# Patient Record
Sex: FEMALE | Race: BLACK OR AFRICAN AMERICAN | Employment: UNEMPLOYED | URBAN - METROPOLITAN AREA
[De-identification: names, ages, dates, MRNs, and addresses within clinical notes are randomized per-mention and may not be internally consistent; named-entity substitution may affect disease eponyms.]

---
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
## 2023-01-10 ENCOUNTER — SCHEDULED TELEPHONE ENCOUNTER (OUTPATIENT)
Dept: OBGYN | Age: 18
End: 2023-01-10

## 2023-01-10 ENCOUNTER — FOLLOWUP TELEPHONE ENCOUNTER (OUTPATIENT)
Dept: OBGYN | Age: 18
End: 2023-01-10

## 2023-01-10 DIAGNOSIS — Z86.59 HISTORY OF DEPRESSION: ICD-10-CM

## 2023-01-10 DIAGNOSIS — A74.9 CHLAMYDIA: ICD-10-CM

## 2023-01-10 DIAGNOSIS — Z83.3 FAMILY HISTORY OF DIABETES MELLITUS IN FIRST DEGREE RELATIVE: ICD-10-CM

## 2023-01-10 DIAGNOSIS — Z13.31 NEGATIVE DEPRESSION SCREENING: ICD-10-CM

## 2023-01-10 DIAGNOSIS — O09.899 HIGH RISK TEEN PREGNANCY, ANTEPARTUM: ICD-10-CM

## 2023-01-10 DIAGNOSIS — Z86.39 HISTORY OF HYPERPROLACTINEMIA: ICD-10-CM

## 2023-01-10 DIAGNOSIS — Z86.39 HISTORY OF VITAMIN D DEFICIENCY: ICD-10-CM

## 2023-01-10 DIAGNOSIS — Z92.29 COVID-19 VACCINE SERIES COMPLETED: ICD-10-CM

## 2023-01-10 DIAGNOSIS — Z59.82 TRANSPORTATION INSECURITY: ICD-10-CM

## 2023-01-10 PROBLEM — Z81.8 FAMILY HISTORY OF AUTISM IN SIBLING: Status: ACTIVE | Noted: 2023-01-10

## 2023-01-10 PROBLEM — Z86.19 HISTORY OF CHLAMYDIA: Status: ACTIVE | Noted: 2023-01-10

## 2023-01-10 PROBLEM — R32 URINARY INCONTINENCE: Status: ACTIVE | Noted: 2023-01-10

## 2023-01-10 RX ORDER — PNV NO.95/FERROUS FUM/FOLIC AC 28MG-0.8MG
1 TABLET ORAL DAILY
Qty: 30 TABLET | Refills: 12 | Status: SHIPPED | OUTPATIENT
Start: 2023-01-10

## 2023-01-10 RX ORDER — ASPIRIN 81 MG/1
81 TABLET ORAL DAILY
Qty: 30 TABLET | Refills: 5 | Status: SHIPPED | OUTPATIENT
Start: 2023-01-10

## 2023-01-10 SDOH — ECONOMIC STABILITY - TRANSPORTATION SECURITY: TRANSPORTATION INSECURITY: Z59.82

## 2023-01-10 ASSESSMENT — PATIENT HEALTH QUESTIONNAIRE - PHQ9
1. LITTLE INTEREST OR PLEASURE IN DOING THINGS: 0
SUM OF ALL RESPONSES TO PHQ9 QUESTIONS 1 & 2: 0
2. FEELING DOWN, DEPRESSED OR HOPELESS: 0
SUM OF ALL RESPONSES TO PHQ QUESTIONS 1-9: 0

## 2023-01-10 NOTE — TELEPHONE ENCOUNTER
SW spoke with Pt for depression screen and [facility] initial assessment. Pt reported having a good support system, needing some help with transportation, baby items. Reports cessation of tobacco, alcohol and thc. SW completed lead screen with Pt. No risks detected  Pt scored 0 on PHQ-2. SW educated Pt on safe sleep, infant mortality, smoking cessation. No issues or concerns with MH symptoms, no depression or anxiety. No issues to discuss with SW at this time outside of transportation. SW will meet with Pt at next appt to assist with applying for Medicaid cab. Pt is linked with [facility] and will contact [address]. Pt agreed to [facility]. Pt informed she will speak with Mary WING to complete the intake. SW will follow up at 28 weeks and as needed. Lead screening for pregnant and breast-feeding women. Do you live in or regularly visit a home built before 1978 that has had renovations, repair work, or remodeling in the past 12 months? No  Have you resided in or emigrated from areas were  contamination is high ([facility])? No  Do you live near a manufacturing plant where lead is used e.g. battery manufacturing or recycling, ship building, or plastic manufacturing? No  Do you work with lead or live with someone who does? No  Do you cook with, store or serve food in [facility]? No  Do you eat none food substances that may be contaminated with lead such as soil or lead glazed ceramic pottery? No  Do you use alternative therapies, herbs or home remedies imported from [facility], [facility], [place], [place] or  countries? No  Do you use imported traditional cosmetics such as yaya or surma that may be contaminated with lead? No  Do you or a family member engage in hobbies where lead is used e.g. stained glass or making pottery with lead glaze? No  Has your home been identified as having lead pipes or water source lines with lead?  No  Have you ever been told that you have high levels of lead in your body? No  Do you live with someone identified as having elevated lead levels, child, close friend or relative?  No      Patients answering yes to any one of the above questions, offer blood lead level testing (LAB98), associate with diagnosis of Screening for Lead Poisoning, Z13.88.
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
## 2023-01-17 ENCOUNTER — HOSPITAL ENCOUNTER (OUTPATIENT)
Age: 18
Setting detail: SPECIMEN
Discharge: HOME | End: 2023-01-17

## 2023-01-17 ENCOUNTER — INITIAL PRENATAL (OUTPATIENT)
Dept: OBGYN | Age: 18
End: 2023-01-17
Payer: MEDICAID

## 2023-01-17 VITALS
BODY MASS INDEX: 29.82 KG/M2 | HEART RATE: 84 BPM | WEIGHT: 190 LBS | HEIGHT: 67 IN | DIASTOLIC BLOOD PRESSURE: 78 MMHG | SYSTOLIC BLOOD PRESSURE: 113 MMHG

## 2023-01-17 DIAGNOSIS — Z83.3 FAMILY HISTORY OF DIABETES MELLITUS IN FIRST DEGREE RELATIVE: ICD-10-CM

## 2023-01-17 DIAGNOSIS — A74.9 CHLAMYDIA: ICD-10-CM

## 2023-01-17 DIAGNOSIS — Z86.59 HISTORY OF DEPRESSION: ICD-10-CM

## 2023-01-17 DIAGNOSIS — O09.899 HIGH RISK TEEN PREGNANCY, ANTEPARTUM: ICD-10-CM

## 2023-01-17 DIAGNOSIS — Z81.8 FAMILY HISTORY OF AUTISM IN SIBLING: ICD-10-CM

## 2023-01-17 DIAGNOSIS — Z3A.12 12 WEEKS GESTATION OF PREGNANCY: ICD-10-CM

## 2023-01-17 DIAGNOSIS — Z3A.12 12 WEEKS GESTATION OF PREGNANCY: Primary | ICD-10-CM

## 2023-01-17 LAB
ABO/RH: NORMAL
ABSOLUTE EOS #: 0.07 K/UL (ref 0–0.44)
ABSOLUTE IMMATURE GRANULOCYTE: 0.09 K/UL (ref 0–0.3)
ABSOLUTE LYMPH #: 1.06 K/UL (ref 1.2–5.2)
ABSOLUTE MONO #: 0.35 K/UL (ref 0.1–1.4)
AMPHETAMINE SCREEN URINE: NEGATIVE
ANTIBODY SCREEN: NEGATIVE
BACTERIA: ABNORMAL
BARBITURATE SCREEN URINE: NEGATIVE
BASOPHILS # BLD: 1 % (ref 0–2)
BASOPHILS ABSOLUTE: 0.03 K/UL (ref 0–0.2)
BENZODIAZEPINE SCREEN, URINE: NEGATIVE
CANDIDA SPECIES, DNA PROBE: NEGATIVE
CANNABINOID SCREEN URINE: NEGATIVE
CASTS UA: ABNORMAL /LPF (ref 0–8)
COCAINE METABOLITE, URINE: NEGATIVE
EOSINOPHILS RELATIVE PERCENT: 1 % (ref 1–4)
EPITHELIAL CELLS UA: ABNORMAL /HPF (ref 0–5)
FENTANYL URINE: NEGATIVE
GARDNERELLA VAGINALIS, DNA PROBE: POSITIVE
GLUCOSE ADMINISTRATION: ABNORMAL
GLUCOSE TOLERANCE SCREEN 50G: 144 MG/DL (ref 70–135)
HCT VFR BLD CALC: 38.3 % (ref 36.3–47.1)
HEMOGLOBIN: 12.7 G/DL (ref 11.9–15.1)
HEPATITIS B SURFACE ANTIGEN: NONREACTIVE
HEPATITIS C ANTIBODY: NONREACTIVE
HIV AG/AB: NONREACTIVE
IMMATURE GRANULOCYTES: 1 %
LYMPHOCYTES # BLD: 17 % (ref 25–45)
MCH RBC QN AUTO: 29.3 PG (ref 25.2–33.5)
MCHC RBC AUTO-ENTMCNC: 33.2 G/DL (ref 28.4–34.8)
MCV RBC AUTO: 88.2 FL (ref 82.6–102.9)
METHADONE SCREEN, URINE: NEGATIVE
MONOCYTES # BLD: 6 % (ref 2–8)
NRBC AUTOMATED: 0 PER 100 WBC
OPIATES, URINE: NEGATIVE
OXYCODONE SCREEN URINE: NEGATIVE
PDW BLD-RTO: 13.2 % (ref 11.8–14.4)
PHENCYCLIDINE, URINE: NEGATIVE
PLATELET # BLD: 325 K/UL (ref 138–453)
PMV BLD AUTO: 9.7 FL (ref 8.1–13.5)
RBC # BLD: 4.34 M/UL (ref 3.95–5.11)
RBC UA: ABNORMAL /HPF (ref 0–4)
RUBV IGG SER QL: 180.2 IU/ML
SEG NEUTROPHILS: 74 % (ref 34–64)
SEGMENTED NEUTROPHILS ABSOLUTE COUNT: 4.78 K/UL (ref 1.8–8)
SOURCE: ABNORMAL
T. PALLIDUM, IGG: NONREACTIVE
TEST INFORMATION: NORMAL
TRICHOMONAS VAGINALIS DNA: POSITIVE
WBC # BLD: 6.4 K/UL (ref 4.5–13.5)
WBC UA: ABNORMAL /HPF (ref 0–5)

## 2023-01-17 PROCEDURE — 90686 IIV4 VACC NO PRSV 0.5 ML IM: CPT | Performed by: STUDENT IN AN ORGANIZED HEALTH CARE EDUCATION/TRAINING PROGRAM

## 2023-01-17 PROCEDURE — 99214 OFFICE O/P EST MOD 30 MIN: CPT

## 2023-01-17 PROCEDURE — 99211 OFF/OP EST MAY X REQ PHY/QHP: CPT
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
## 2023-01-17 NOTE — PROGRESS NOTES
Pt is unable to void at the time of rooming    Patient was given influenza vaccine in the Right Deltoid.  Per doctor Anabelo Desire  NDC# 45878-682-46  LOT# [id number]   Exp date- 6/30/23  Patient tolerated well without difficulty

## 2023-01-18 DIAGNOSIS — O09.91 HRP (HIGH RISK PREGNANCY), FIRST TRIMESTER: Primary | ICD-10-CM

## 2023-01-18 PROBLEM — R73.09 GLUCOSE TOLERANCE TEST ABNORMAL: Status: ACTIVE | Noted: 2023-01-18

## 2023-01-18 LAB
C TRACH DNA GENITAL QL NAA+PROBE: ABNORMAL
CULTURE: ABNORMAL
HGB ELECTROPHORESIS INTERP: NORMAL
N. GONORRHOEAE DNA: NEGATIVE
PATHOLOGIST: NORMAL
SPECIMEN DESCRIPTION: ABNORMAL
SPECIMEN DESCRIPTION: ABNORMAL

## 2023-01-18 RX ORDER — CEPHALEXIN 500 MG/1
500 CAPSULE ORAL 4 TIMES DAILY
Qty: 28 CAPSULE | Refills: 0 | Status: SHIPPED | OUTPATIENT
Start: 2023-01-18 | End: 2023-01-25

## 2023-01-24 ENCOUNTER — TELEPHONE (OUTPATIENT)
Dept: OBGYN | Age: 18
End: 2023-01-24

## 2023-01-24 DIAGNOSIS — A74.9 CHLAMYDIA: Primary | ICD-10-CM

## 2023-01-24 RX ORDER — METRONIDAZOLE 500 MG/1
500 TABLET ORAL 2 TIMES DAILY
Qty: 14 TABLET | Refills: 0 | Status: SHIPPED | OUTPATIENT
Start: 2023-01-24 | End: 2023-01-31

## 2023-01-24 RX ORDER — AZITHROMYCIN 500 MG/1
1000 TABLET, FILM COATED ORAL ONCE
Qty: 2 TABLET | Refills: 0 | Status: SHIPPED | OUTPATIENT
Start: 2023-01-24 | End: 2023-01-24

## 2023-01-24 NOTE — TELEPHONE ENCOUNTER
Rx sent for treatment of trichomonas, BV, and chlamyida. Message sent to [facility] float pool to contact patient and inform treatment and scheduled Test of Cure.      Jenny Jimenez MD  OB/GYN Resident, PGY1   [facility]   1/24/2023 9:57 AM

## 2023-03-21 ENCOUNTER — HOSPITAL ENCOUNTER (OUTPATIENT)
Age: 18
Setting detail: SPECIMEN
Discharge: HOME | End: 2023-03-21

## 2023-03-21 ENCOUNTER — ROUTINE PRENATAL (OUTPATIENT)
Dept: OBGYN | Age: 18
End: 2023-03-21

## 2023-03-21 VITALS
DIASTOLIC BLOOD PRESSURE: 76 MMHG | BODY MASS INDEX: 29.29 KG/M2 | SYSTOLIC BLOOD PRESSURE: 116 MMHG | WEIGHT: 187 LBS | HEART RATE: 100 BPM

## 2023-03-21 DIAGNOSIS — Z3A.21 21 WEEKS GESTATION OF PREGNANCY: ICD-10-CM

## 2023-03-21 DIAGNOSIS — Z3A.21 21 WEEKS GESTATION OF PREGNANCY: Primary | ICD-10-CM

## 2023-03-21 DIAGNOSIS — R73.09 GLUCOSE TOLERANCE TEST ABNORMAL: ICD-10-CM

## 2023-03-21 NOTE — PROGRESS NOTES
Pt unable to void.
COVID-19 booster in pregnancy. Pregravid BMI 28.97 01/10/2023     Patient denies regular exercise. 1/10/23: One hour glucose tolerance test ordered for early diabetic screening (GA 11w4d at time of order)       Return in about 4 weeks (around 4/18/2023) for Routine OB Visit. Marco Ross MD  Ob/Gyn Resident  [facility], [address]  3/21/2023, 3:21 PM         Attending Physician Statement  I have discussed the care of Edin Tang, including pertinent history and exam findings,  with the resident. I have reviewed the key elements of all parts of the encounter with the resident. I agree with the assessment, plan and orders as documented by the resident.   (GE Modifier)    Electronically signed by Jennifer Meléndez DO  3/24/2023  7:47 AM

## 2023-03-22 LAB
C TRACH DNA SPEC QL PROBE+SIG AMP: NEGATIVE
CANDIDA SPECIES, DNA PROBE: NEGATIVE
GARDNERELLA VAGINALIS, DNA PROBE: NEGATIVE
N GONORRHOEA DNA SPEC QL PROBE+SIG AMP: NEGATIVE
SOURCE: NORMAL
SPECIMEN DESCRIPTION: NORMAL
TRICHOMONAS VAGINALIS DNA: NEGATIVE

## 2023-04-16 SDOH — ECONOMIC STABILITY: FOOD INSECURITY: WITHIN THE PAST 12 MONTHS, THE FOOD YOU BOUGHT JUST DIDN'T LAST AND YOU DIDN'T HAVE MONEY TO GET MORE.: NEVER TRUE

## 2023-04-16 SDOH — ECONOMIC STABILITY: HOUSING INSECURITY
IN THE LAST 12 MONTHS, WAS THERE A TIME WHEN YOU DID NOT HAVE A STEADY PLACE TO SLEEP OR SLEPT IN A SHELTER (INCLUDING NOW)?: NO

## 2023-04-16 SDOH — ECONOMIC STABILITY: FOOD INSECURITY: WITHIN THE PAST 12 MONTHS, YOU WORRIED THAT YOUR FOOD WOULD RUN OUT BEFORE YOU GOT MONEY TO BUY MORE.: NEVER TRUE

## 2023-04-16 SDOH — ECONOMIC STABILITY: INCOME INSECURITY: HOW HARD IS IT FOR YOU TO PAY FOR THE VERY BASICS LIKE FOOD, HOUSING, MEDICAL CARE, AND HEATING?: SOMEWHAT HARD

## 2023-04-16 SDOH — ECONOMIC STABILITY: TRANSPORTATION INSECURITY
IN THE PAST 12 MONTHS, HAS LACK OF TRANSPORTATION KEPT YOU FROM MEETINGS, WORK, OR FROM GETTING THINGS NEEDED FOR DAILY LIVING?: YES

## 2023-04-18 ENCOUNTER — ROUTINE PRENATAL (OUTPATIENT)
Dept: OBGYN | Age: 18
End: 2023-04-18
Payer: MEDICAID

## 2023-04-18 VITALS
WEIGHT: 194 LBS | SYSTOLIC BLOOD PRESSURE: 130 MMHG | BODY MASS INDEX: 30.38 KG/M2 | DIASTOLIC BLOOD PRESSURE: 81 MMHG | HEART RATE: 111 BPM

## 2023-04-18 DIAGNOSIS — Z3A.25 25 WEEKS GESTATION OF PREGNANCY: ICD-10-CM

## 2023-04-18 DIAGNOSIS — R73.09 GLUCOSE TOLERANCE TEST ABNORMAL: Primary | ICD-10-CM

## 2023-04-18 PROCEDURE — 99213 OFFICE O/P EST LOW 20 MIN: CPT

## 2023-04-18 NOTE — PROGRESS NOTES
Unable to void
[facility]  4/18/2023, 2:05 PM        Attending Physician Statement  I have discussed the care of Nirmala Aldana, including pertinent history and exam findings,  with the resident. I have reviewed the key elements of all parts of the encounter with the resident. I agree with the assessment, plan and orders as documented by the resident. (GE Modifier)    Electronically signed by Thuy Carpenter DO  4/18/2023  2:13 PM  Will get follow up urine given recent UTI.  Counseled on need for 3hr.

## 2023-04-19 ENCOUNTER — ROUTINE PRENATAL (OUTPATIENT)
Dept: PERINATAL CARE | Age: 18
End: 2023-04-19
Payer: MEDICAID

## 2023-04-19 VITALS
WEIGHT: 191 LBS | HEART RATE: 98 BPM | HEIGHT: 67 IN | DIASTOLIC BLOOD PRESSURE: 66 MMHG | TEMPERATURE: 98.2 F | BODY MASS INDEX: 29.98 KG/M2 | RESPIRATION RATE: 16 BRPM | SYSTOLIC BLOOD PRESSURE: 112 MMHG

## 2023-04-19 DIAGNOSIS — O44.22 PLACENTA MARGINALIS IN SECOND TRIMESTER: ICD-10-CM

## 2023-04-19 DIAGNOSIS — O99.810 ABNORMAL MATERNAL GLUCOSE TOLERANCE, ANTEPARTUM: ICD-10-CM

## 2023-04-19 DIAGNOSIS — O36.5920 INTRAUTERINE GROWTH RESTRICTION (IUGR) AFFECTING CARE OF MOTHER, SECOND TRIMESTER, SINGLE GESTATION: Primary | ICD-10-CM

## 2023-04-19 DIAGNOSIS — O09.32 INSUFFICIENT PRENATAL CARE IN SECOND TRIMESTER: ICD-10-CM

## 2023-04-19 DIAGNOSIS — Z3A.25 25 WEEKS GESTATION OF PREGNANCY: ICD-10-CM

## 2023-04-19 PROBLEM — O36.5990 FETAL GROWTH RESTRICTION ANTEPARTUM: Status: ACTIVE | Noted: 2023-04-19

## 2023-04-19 LAB
ABDOMINAL CIRCUMFERENCE: NORMAL
ABDOMINAL CIRCUMFERENCE: NORMAL
BIPARIETAL DIAMETER: NORMAL
BIPARIETAL DIAMETER: NORMAL
ESTIMATED FETAL WEIGHT: NORMAL
ESTIMATED FETAL WEIGHT: NORMAL
FEMORAL DIAMETER: NORMAL
FEMORAL DIAMETER: NORMAL
HC/AC: NORMAL
HC/AC: NORMAL
HEAD CIRCUMFERENCE: NORMAL
HEAD CIRCUMFERENCE: NORMAL

## 2023-04-19 PROCEDURE — 76817 TRANSVAGINAL US OBSTETRIC: CPT | Performed by: OBSTETRICS & GYNECOLOGY

## 2023-04-19 PROCEDURE — 76821 MIDDLE CEREBRAL ARTERY ECHO: CPT | Performed by: OBSTETRICS & GYNECOLOGY

## 2023-04-19 PROCEDURE — 76811 OB US DETAILED SNGL FETUS: CPT | Performed by: OBSTETRICS & GYNECOLOGY

## 2023-04-19 PROCEDURE — 76820 UMBILICAL ARTERY ECHO: CPT | Performed by: OBSTETRICS & GYNECOLOGY

## 2023-04-19 PROCEDURE — 99999 PR OFFICE/OUTPT VISIT,PROCEDURE ONLY: CPT | Performed by: OBSTETRICS & GYNECOLOGY

## 2023-04-19 NOTE — PROGRESS NOTES
Obstetric/Gynecology Maternal Fetal Medicine Resident Note    Pt informed of marginal cord and new finding of FGR (AC<10%) on today's ultrasound. Pt encouraged to present to her scheduled MFM consultation. Pt reports she does desire to keep the scheduled consultation.      Lola Gonzales MD  OBGYN Resident, PGY1  [facility]  4/19/2023, 4:09 PM

## 2023-05-02 ENCOUNTER — ROUTINE PRENATAL (OUTPATIENT)
Dept: PERINATAL CARE | Age: 18
End: 2023-05-02
Payer: MEDICAID

## 2023-05-02 ENCOUNTER — HOSPITAL ENCOUNTER (OUTPATIENT)
Age: 18
Discharge: HOME | End: 2023-05-02
Payer: MEDICAID

## 2023-05-02 VITALS
TEMPERATURE: 98.4 F | RESPIRATION RATE: 16 BRPM | BODY MASS INDEX: 30.45 KG/M2 | DIASTOLIC BLOOD PRESSURE: 66 MMHG | WEIGHT: 194 LBS | SYSTOLIC BLOOD PRESSURE: 122 MMHG | HEART RATE: 90 BPM | HEIGHT: 67 IN

## 2023-05-02 DIAGNOSIS — O44.23 PLACENTA MARGINALIS IN THIRD TRIMESTER: ICD-10-CM

## 2023-05-02 DIAGNOSIS — O99.810 ABNORMAL MATERNAL GLUCOSE TOLERANCE, ANTEPARTUM: ICD-10-CM

## 2023-05-02 DIAGNOSIS — Z3A.27 27 WEEKS GESTATION OF PREGNANCY: ICD-10-CM

## 2023-05-02 DIAGNOSIS — O36.5930 INTRAUTERINE GROWTH RESTRICTION (IUGR) AFFECTING CARE OF MOTHER, THIRD TRIMESTER, SINGLE GESTATION: Primary | ICD-10-CM

## 2023-05-02 DIAGNOSIS — O09.33 INSUFFICIENT PRENATAL CARE IN THIRD TRIMESTER: ICD-10-CM

## 2023-05-02 PROBLEM — O43.199 MARGINAL INSERTION OF UMBILICAL CORD AFFECTING MANAGEMENT OF MOTHER: Status: ACTIVE | Noted: 2023-05-02

## 2023-05-02 PROCEDURE — 36415 COLL VENOUS BLD VENIPUNCTURE: CPT

## 2023-05-02 PROCEDURE — 99244 OFF/OP CNSLTJ NEW/EST MOD 40: CPT | Performed by: OBSTETRICS & GYNECOLOGY

## 2023-05-03 LAB
SEND OUT REPORT: NORMAL
TEST NAME: NORMAL

## 2023-05-05 ENCOUNTER — TELEPHONE (OUTPATIENT)
Dept: OBGYN | Age: 18
End: 2023-05-05

## 2023-05-17 ENCOUNTER — ROUTINE PRENATAL (OUTPATIENT)
Dept: PERINATAL CARE | Age: 18
End: 2023-05-17
Payer: MEDICAID

## 2023-05-17 ENCOUNTER — HOSPITAL ENCOUNTER (OUTPATIENT)
Age: 18
Discharge: HOME | End: 2023-05-17
Payer: MEDICAID

## 2023-05-17 VITALS
DIASTOLIC BLOOD PRESSURE: 67 MMHG | BODY MASS INDEX: 30.29 KG/M2 | RESPIRATION RATE: 16 BRPM | WEIGHT: 193 LBS | HEART RATE: 108 BPM | TEMPERATURE: 98.2 F | HEIGHT: 67 IN | SYSTOLIC BLOOD PRESSURE: 106 MMHG

## 2023-05-17 DIAGNOSIS — R73.09 GLUCOSE TOLERANCE TEST ABNORMAL: ICD-10-CM

## 2023-05-17 DIAGNOSIS — O99.810 ABNORMAL MATERNAL GLUCOSE TOLERANCE, ANTEPARTUM: ICD-10-CM

## 2023-05-17 DIAGNOSIS — O36.5930 INTRAUTERINE GROWTH RESTRICTION (IUGR) AFFECTING CARE OF MOTHER, THIRD TRIMESTER, SINGLE GESTATION: Primary | ICD-10-CM

## 2023-05-17 DIAGNOSIS — Z36.4 ULTRASOUND FOR ANTENATAL SCREENING FOR FETAL GROWTH RESTRICTION: ICD-10-CM

## 2023-05-17 DIAGNOSIS — O44.23 PLACENTA MARGINALIS IN THIRD TRIMESTER: ICD-10-CM

## 2023-05-17 DIAGNOSIS — Z3A.29 29 WEEKS GESTATION OF PREGNANCY: ICD-10-CM

## 2023-05-17 DIAGNOSIS — O09.33 INSUFFICIENT PRENATAL CARE IN THIRD TRIMESTER: ICD-10-CM

## 2023-05-17 LAB
ABDOMINAL CIRCUMFERENCE: NORMAL
ABDOMINAL CIRCUMFERENCE: NORMAL
BACTERIA URNS QL MICRO: ABNORMAL
BASOPHILS # BLD: 0.03 K/UL (ref 0–0.2)
BASOPHILS NFR BLD: 0 % (ref 0–2)
BILIRUB UR QL STRIP: NEGATIVE
BIPARIETAL DIAMETER: NORMAL
BIPARIETAL DIAMETER: NORMAL
CASTS #/AREA URNS LPF: ABNORMAL /LPF (ref 0–8)
CLARITY UR: ABNORMAL
COLOR UR: YELLOW
EOSINOPHIL # BLD: 0.08 K/UL (ref 0–0.44)
EOSINOPHILS RELATIVE PERCENT: 1 % (ref 1–4)
EPI CELLS #/AREA URNS HPF: ABNORMAL /HPF (ref 0–5)
ERYTHROCYTE [DISTWIDTH] IN BLOOD BY AUTOMATED COUNT: 13.2 % (ref 11.8–14.4)
ESTIMATED FETAL WEIGHT: NORMAL
ESTIMATED FETAL WEIGHT: NORMAL
FEMORAL DIAMETER: NORMAL
FEMORAL DIAMETER: NORMAL
GLUCOSE UR STRIP.AUTO-MCNC: NEGATIVE MG/DL
HC/AC: NORMAL
HC/AC: NORMAL
HCT VFR BLD AUTO: 34.1 % (ref 36.3–47.1)
HEAD CIRCUMFERENCE: NORMAL
HEAD CIRCUMFERENCE: NORMAL
HGB BLD-MCNC: 10.8 G/DL (ref 11.9–15.1)
HGB UR QL STRIP.AUTO: NEGATIVE
IMM GRANULOCYTES # BLD AUTO: 0.15 K/UL (ref 0–0.3)
IMM GRANULOCYTES NFR BLD: 1 %
KETONES UR STRIP.AUTO-MCNC: NEGATIVE MG/DL
LEUKOCYTE ESTERASE UR QL STRIP: ABNORMAL
LYMPHOCYTES # BLD: 15 % (ref 25–45)
LYMPHOCYTES NFR BLD: 1.73 K/UL (ref 1.2–5.2)
MCH RBC QN AUTO: 28.7 PG (ref 25.2–33.5)
MCHC RBC AUTO-ENTMCNC: 31.7 G/DL (ref 28.4–34.8)
MCV RBC AUTO: 90.7 FL (ref 82.6–102.9)
MONOCYTES NFR BLD: 1.02 K/UL (ref 0.1–1.4)
MONOCYTES NFR BLD: 9 % (ref 2–8)
NEUTROPHILS NFR BLD: 74 % (ref 34–64)
NEUTS SEG NFR BLD: 8.64 K/UL (ref 1.8–8)
NITRITE UR QL STRIP: NEGATIVE
NRBC AUTOMATED: 0 PER 100 WBC
PLATELET # BLD AUTO: 291 K/UL (ref 138–453)
PMV BLD AUTO: 8.5 FL (ref 8.1–13.5)
PROT UR STRIP-MCNC: ABNORMAL MG/DL
PROT UR STRIP.AUTO-MCNC: 8 MG/DL (ref 5–8)
RBC # BLD AUTO: 3.76 M/UL (ref 3.95–5.11)
RBC #/AREA URNS HPF: ABNORMAL /HPF (ref 0–4)
SP GR UR STRIP.AUTO: 1.02 (ref 1–1.03)
UROBILINOGEN UR STRIP-ACNC: NORMAL
WBC #/AREA URNS HPF: ABNORMAL /HPF (ref 0–5)
WBC OTHER # BLD: 11.7 K/UL (ref 4.5–13.5)

## 2023-05-17 PROCEDURE — 76820 UMBILICAL ARTERY ECHO: CPT | Performed by: OBSTETRICS & GYNECOLOGY

## 2023-05-17 PROCEDURE — 76819 FETAL BIOPHYS PROFIL W/O NST: CPT | Performed by: OBSTETRICS & GYNECOLOGY

## 2023-05-17 PROCEDURE — 76817 TRANSVAGINAL US OBSTETRIC: CPT | Performed by: OBSTETRICS & GYNECOLOGY

## 2023-05-17 PROCEDURE — 85025 COMPLETE CBC W/AUTO DIFF WBC: CPT

## 2023-05-17 PROCEDURE — 76816 OB US FOLLOW-UP PER FETUS: CPT | Performed by: OBSTETRICS & GYNECOLOGY

## 2023-05-17 PROCEDURE — 87086 URINE CULTURE/COLONY COUNT: CPT

## 2023-05-17 PROCEDURE — 99999 PR OFFICE/OUTPT VISIT,PROCEDURE ONLY: CPT | Performed by: OBSTETRICS & GYNECOLOGY

## 2023-05-17 PROCEDURE — 76821 MIDDLE CEREBRAL ARTERY ECHO: CPT | Performed by: OBSTETRICS & GYNECOLOGY

## 2023-05-17 PROCEDURE — 36415 COLL VENOUS BLD VENIPUNCTURE: CPT

## 2023-05-17 PROCEDURE — 81001 URINALYSIS AUTO W/SCOPE: CPT

## 2023-05-18 LAB
MICROORGANISM SPEC CULT: NORMAL
SPECIMEN DESCRIPTION: NORMAL

## 2023-05-22 RX ORDER — CEPHALEXIN 500 MG/1
500 CAPSULE ORAL 4 TIMES DAILY
Qty: 28 CAPSULE | Refills: 0 | Status: SHIPPED | OUTPATIENT
Start: 2023-05-22 | End: 2023-05-29

## 2023-06-01 ENCOUNTER — ROUTINE PRENATAL (OUTPATIENT)
Dept: OBGYN | Age: 18
End: 2023-06-01
Payer: MEDICAID

## 2023-06-01 VITALS
DIASTOLIC BLOOD PRESSURE: 78 MMHG | WEIGHT: 197 LBS | HEART RATE: 105 BPM | SYSTOLIC BLOOD PRESSURE: 115 MMHG | BODY MASS INDEX: 30.85 KG/M2

## 2023-06-01 DIAGNOSIS — R73.09 GLUCOSE TOLERANCE TEST ABNORMAL: ICD-10-CM

## 2023-06-01 DIAGNOSIS — Z3A.31 31 WEEKS GESTATION OF PREGNANCY: Primary | ICD-10-CM

## 2023-06-01 PROBLEM — R32 URINARY INCONTINENCE: Status: RESOLVED | Noted: 2023-01-10 | Resolved: 2023-06-01

## 2023-06-01 PROBLEM — Z3A.12 12 WEEKS GESTATION OF PREGNANCY: Status: RESOLVED | Noted: 2023-01-17 | Resolved: 2023-06-01

## 2023-06-01 PROCEDURE — 90715 TDAP VACCINE 7 YRS/> IM: CPT | Performed by: OBSTETRICS & GYNECOLOGY

## 2023-06-01 PROCEDURE — 99213 OFFICE O/P EST LOW 20 MIN: CPT | Performed by: STUDENT IN AN ORGANIZED HEALTH CARE EDUCATION/TRAINING PROGRAM

## 2023-06-01 PROCEDURE — 99211 OFF/OP EST MAY X REQ PHY/QHP: CPT | Performed by: STUDENT IN AN ORGANIZED HEALTH CARE EDUCATION/TRAINING PROGRAM

## 2023-06-06 NOTE — PROGRESS NOTES
Attending Physician Statement  I have discussed the care of Kirsten Saleem, including pertinent history and exam findings,  with the resident. I have reviewed the key elements of all parts of the encounter with the resident. I agree with the assessment, plan and orders as documented by the resident.   (GE Modifier)    Ileana Juarez,

## 2023-06-07 ENCOUNTER — ROUTINE PRENATAL (OUTPATIENT)
Dept: PERINATAL CARE | Age: 18
End: 2023-06-07
Payer: MEDICAID

## 2023-06-07 VITALS
WEIGHT: 199 LBS | HEIGHT: 67 IN | SYSTOLIC BLOOD PRESSURE: 120 MMHG | DIASTOLIC BLOOD PRESSURE: 70 MMHG | BODY MASS INDEX: 31.23 KG/M2 | TEMPERATURE: 98.2 F | RESPIRATION RATE: 16 BRPM | HEART RATE: 117 BPM

## 2023-06-07 DIAGNOSIS — Z36.4 ULTRASOUND FOR ANTENATAL SCREENING FOR FETAL GROWTH RESTRICTION: ICD-10-CM

## 2023-06-07 DIAGNOSIS — O44.23 PLACENTA MARGINALIS IN THIRD TRIMESTER: ICD-10-CM

## 2023-06-07 DIAGNOSIS — O36.5930 INTRAUTERINE GROWTH RESTRICTION (IUGR) AFFECTING CARE OF MOTHER, THIRD TRIMESTER, SINGLE GESTATION: Primary | ICD-10-CM

## 2023-06-07 DIAGNOSIS — O99.810 ABNORMAL MATERNAL GLUCOSE TOLERANCE, ANTEPARTUM: ICD-10-CM

## 2023-06-07 DIAGNOSIS — Z3A.32 32 WEEKS GESTATION OF PREGNANCY: ICD-10-CM

## 2023-06-07 LAB
ABDOMINAL CIRCUMFERENCE: NORMAL
BIPARIETAL DIAMETER: NORMAL
ESTIMATED FETAL WEIGHT: NORMAL
FEMORAL DIAMETER: NORMAL
HC/AC: NORMAL
HEAD CIRCUMFERENCE: NORMAL

## 2023-06-07 PROCEDURE — 76820 UMBILICAL ARTERY ECHO: CPT | Performed by: OBSTETRICS & GYNECOLOGY

## 2023-06-07 PROCEDURE — 99999 PR OFFICE/OUTPT VISIT,PROCEDURE ONLY: CPT | Performed by: OBSTETRICS & GYNECOLOGY

## 2023-06-07 PROCEDURE — 76819 FETAL BIOPHYS PROFIL W/O NST: CPT | Performed by: OBSTETRICS & GYNECOLOGY

## 2023-06-07 PROCEDURE — 76821 MIDDLE CEREBRAL ARTERY ECHO: CPT | Performed by: OBSTETRICS & GYNECOLOGY

## 2023-06-07 PROCEDURE — 76816 OB US FOLLOW-UP PER FETUS: CPT | Performed by: OBSTETRICS & GYNECOLOGY

## 2023-06-21 ENCOUNTER — ROUTINE PRENATAL (OUTPATIENT)
Dept: PERINATAL CARE | Age: 18
End: 2023-06-21
Payer: MEDICAID

## 2023-06-21 VITALS
SYSTOLIC BLOOD PRESSURE: 109 MMHG | TEMPERATURE: 98 F | RESPIRATION RATE: 16 BRPM | HEART RATE: 105 BPM | BODY MASS INDEX: 31.71 KG/M2 | DIASTOLIC BLOOD PRESSURE: 72 MMHG | WEIGHT: 202 LBS | HEIGHT: 67 IN

## 2023-06-21 DIAGNOSIS — O44.23 PLACENTA MARGINALIS IN THIRD TRIMESTER: ICD-10-CM

## 2023-06-21 DIAGNOSIS — O36.5930 INTRAUTERINE GROWTH RESTRICTION (IUGR) AFFECTING CARE OF MOTHER, THIRD TRIMESTER, SINGLE GESTATION: Primary | ICD-10-CM

## 2023-06-21 DIAGNOSIS — Z3A.34 34 WEEKS GESTATION OF PREGNANCY: ICD-10-CM

## 2023-06-21 DIAGNOSIS — O99.810 ABNORMAL MATERNAL GLUCOSE TOLERANCE, ANTEPARTUM: ICD-10-CM

## 2023-06-21 PROCEDURE — 76815 OB US LIMITED FETUS(S): CPT | Performed by: OBSTETRICS & GYNECOLOGY

## 2023-06-21 PROCEDURE — 76819 FETAL BIOPHYS PROFIL W/O NST: CPT | Performed by: OBSTETRICS & GYNECOLOGY

## 2023-06-21 PROCEDURE — 76820 UMBILICAL ARTERY ECHO: CPT | Performed by: OBSTETRICS & GYNECOLOGY

## 2023-06-21 PROCEDURE — 76821 MIDDLE CEREBRAL ARTERY ECHO: CPT | Performed by: OBSTETRICS & GYNECOLOGY

## 2023-06-28 ENCOUNTER — ROUTINE PRENATAL (OUTPATIENT)
Dept: PERINATAL CARE | Age: 18
End: 2023-06-28
Payer: MEDICAID

## 2023-06-28 VITALS
HEIGHT: 67 IN | RESPIRATION RATE: 16 BRPM | DIASTOLIC BLOOD PRESSURE: 82 MMHG | HEART RATE: 108 BPM | BODY MASS INDEX: 31.86 KG/M2 | TEMPERATURE: 98.1 F | WEIGHT: 203 LBS | SYSTOLIC BLOOD PRESSURE: 125 MMHG

## 2023-06-28 DIAGNOSIS — O36.5930 INTRAUTERINE GROWTH RESTRICTION (IUGR) AFFECTING CARE OF MOTHER, THIRD TRIMESTER, SINGLE GESTATION: ICD-10-CM

## 2023-06-28 DIAGNOSIS — O44.23 PLACENTA MARGINALIS IN THIRD TRIMESTER: Primary | ICD-10-CM

## 2023-06-28 DIAGNOSIS — Z03.74 SUSPECTED PROBLEM WITH FETAL GROWTH NOT FOUND: ICD-10-CM

## 2023-06-28 DIAGNOSIS — O09.33 INSUFFICIENT PRENATAL CARE IN THIRD TRIMESTER: ICD-10-CM

## 2023-06-28 DIAGNOSIS — Z3A.35 35 WEEKS GESTATION OF PREGNANCY: ICD-10-CM

## 2023-06-28 DIAGNOSIS — Z13.89 ENCOUNTER FOR ROUTINE SCREENING FOR MALFORMATION USING ULTRASONICS: ICD-10-CM

## 2023-06-28 DIAGNOSIS — O99.810 ABNORMAL MATERNAL GLUCOSE TOLERANCE, ANTEPARTUM: ICD-10-CM

## 2023-06-28 DIAGNOSIS — Z36.4 ULTRASOUND FOR ANTENATAL SCREENING FOR FETAL GROWTH RESTRICTION: ICD-10-CM

## 2023-06-28 PROCEDURE — 76819 FETAL BIOPHYS PROFIL W/O NST: CPT | Performed by: OBSTETRICS & GYNECOLOGY

## 2023-06-28 PROCEDURE — 99999 PR OFFICE/OUTPT VISIT,PROCEDURE ONLY: CPT | Performed by: OBSTETRICS & GYNECOLOGY

## 2023-06-28 PROCEDURE — 76805 OB US >/= 14 WKS SNGL FETUS: CPT | Performed by: OBSTETRICS & GYNECOLOGY

## 2023-06-28 PROCEDURE — 76820 UMBILICAL ARTERY ECHO: CPT | Performed by: OBSTETRICS & GYNECOLOGY

## 2023-07-05 ENCOUNTER — ROUTINE PRENATAL (OUTPATIENT)
Dept: PERINATAL CARE | Age: 18
End: 2023-07-05
Payer: MEDICAID

## 2023-07-05 VITALS
RESPIRATION RATE: 16 BRPM | BODY MASS INDEX: 32.33 KG/M2 | WEIGHT: 206 LBS | TEMPERATURE: 98.2 F | SYSTOLIC BLOOD PRESSURE: 114 MMHG | HEART RATE: 104 BPM | DIASTOLIC BLOOD PRESSURE: 71 MMHG | HEIGHT: 67 IN

## 2023-07-05 DIAGNOSIS — O44.23 PLACENTA MARGINALIS IN THIRD TRIMESTER: Primary | ICD-10-CM

## 2023-07-05 DIAGNOSIS — O99.810 ABNORMAL MATERNAL GLUCOSE TOLERANCE, ANTEPARTUM: ICD-10-CM

## 2023-07-05 DIAGNOSIS — Z3A.36 36 WEEKS GESTATION OF PREGNANCY: ICD-10-CM

## 2023-07-05 DIAGNOSIS — O09.33 INSUFFICIENT PRENATAL CARE IN THIRD TRIMESTER: ICD-10-CM

## 2023-07-05 PROCEDURE — 76820 UMBILICAL ARTERY ECHO: CPT | Performed by: OBSTETRICS & GYNECOLOGY

## 2023-07-05 PROCEDURE — 76815 OB US LIMITED FETUS(S): CPT | Performed by: OBSTETRICS & GYNECOLOGY

## 2023-07-05 PROCEDURE — 76819 FETAL BIOPHYS PROFIL W/O NST: CPT | Performed by: OBSTETRICS & GYNECOLOGY

## 2023-07-05 PROCEDURE — 99999 PR OFFICE/OUTPT VISIT,PROCEDURE ONLY: CPT | Performed by: OBSTETRICS & GYNECOLOGY

## 2023-07-12 ENCOUNTER — ROUTINE PRENATAL (OUTPATIENT)
Dept: PERINATAL CARE | Age: 18
End: 2023-07-12
Payer: MEDICAID

## 2023-07-12 VITALS
HEART RATE: 92 BPM | TEMPERATURE: 97.2 F | BODY MASS INDEX: 32.65 KG/M2 | DIASTOLIC BLOOD PRESSURE: 82 MMHG | RESPIRATION RATE: 16 BRPM | WEIGHT: 208 LBS | SYSTOLIC BLOOD PRESSURE: 118 MMHG | HEIGHT: 67 IN

## 2023-07-12 DIAGNOSIS — O44.23 PLACENTA MARGINALIS IN THIRD TRIMESTER: Primary | ICD-10-CM

## 2023-07-12 DIAGNOSIS — O99.810 ABNORMAL MATERNAL GLUCOSE TOLERANCE, ANTEPARTUM: ICD-10-CM

## 2023-07-12 DIAGNOSIS — Z3A.37 37 WEEKS GESTATION OF PREGNANCY: ICD-10-CM

## 2023-07-12 DIAGNOSIS — O09.33 INSUFFICIENT PRENATAL CARE IN THIRD TRIMESTER: ICD-10-CM

## 2023-07-12 PROCEDURE — 76819 FETAL BIOPHYS PROFIL W/O NST: CPT | Performed by: OBSTETRICS & GYNECOLOGY

## 2023-07-12 PROCEDURE — 76820 UMBILICAL ARTERY ECHO: CPT | Performed by: OBSTETRICS & GYNECOLOGY

## 2023-07-12 PROCEDURE — 99999 PR OFFICE/OUTPT VISIT,PROCEDURE ONLY: CPT | Performed by: OBSTETRICS & GYNECOLOGY

## 2023-07-12 PROCEDURE — 76815 OB US LIMITED FETUS(S): CPT | Performed by: OBSTETRICS & GYNECOLOGY

## 2023-07-19 ENCOUNTER — HOSPITAL ENCOUNTER (INPATIENT)
Age: 18
LOS: 3 days | Discharge: HOME | End: 2023-07-22
Attending: OBSTETRICS & GYNECOLOGY | Admitting: OBSTETRICS & GYNECOLOGY
Payer: MEDICAID

## 2023-07-19 ENCOUNTER — ROUTINE PRENATAL (OUTPATIENT)
Dept: PERINATAL CARE | Age: 18
End: 2023-07-19
Payer: MEDICAID

## 2023-07-19 ENCOUNTER — TELEPHONE (OUTPATIENT)
Dept: OBGYN | Age: 18
End: 2023-07-19

## 2023-07-19 VITALS
HEART RATE: 85 BPM | SYSTOLIC BLOOD PRESSURE: 118 MMHG | WEIGHT: 208 LBS | HEIGHT: 67 IN | TEMPERATURE: 97.9 F | DIASTOLIC BLOOD PRESSURE: 73 MMHG | BODY MASS INDEX: 32.65 KG/M2 | RESPIRATION RATE: 16 BRPM

## 2023-07-19 DIAGNOSIS — O44.23 PLACENTA MARGINALIS IN THIRD TRIMESTER: ICD-10-CM

## 2023-07-19 DIAGNOSIS — O36.5930 INTRAUTERINE GROWTH RESTRICTION (IUGR) AFFECTING CARE OF MOTHER, THIRD TRIMESTER, SINGLE GESTATION: Primary | ICD-10-CM

## 2023-07-19 DIAGNOSIS — O09.33 INSUFFICIENT PRENATAL CARE IN THIRD TRIMESTER: ICD-10-CM

## 2023-07-19 PROBLEM — Z92.29 COVID-19 VACCINE SERIES COMPLETED: Status: RESOLVED | Noted: 2023-01-10 | Resolved: 2023-07-19

## 2023-07-19 PROBLEM — N94.6 DYSMENORRHEA: Status: ACTIVE | Noted: 2023-07-19

## 2023-07-19 PROBLEM — Z13.31 NEGATIVE DEPRESSION SCREENING: Status: RESOLVED | Noted: 2023-01-10 | Resolved: 2023-07-19

## 2023-07-19 PROBLEM — Z3A.38 38 WEEKS GESTATION OF PREGNANCY: Status: ACTIVE | Noted: 2023-07-19

## 2023-07-19 LAB
ABDOMINAL CIRCUMFERENCE: NORMAL
ABO + RH BLD: NORMAL
AMPHET UR QL SCN: NEGATIVE
ARM BAND NUMBER: NORMAL
BARBITURATES UR QL SCN: NEGATIVE
BASOPHILS # BLD: 0.03 K/UL (ref 0–0.2)
BASOPHILS NFR BLD: 0 % (ref 0–2)
BENZODIAZ UR QL: NEGATIVE
BIPARIETAL DIAMETER: NORMAL
BLOOD BANK SAMPLE EXPIRATION: NORMAL
BLOOD GROUP ANTIBODIES SERPL: NEGATIVE
CANNABINOIDS UR QL SCN: NEGATIVE
COCAINE UR QL SCN: NEGATIVE
EOSINOPHIL # BLD: 0.05 K/UL (ref 0–0.44)
EOSINOPHILS RELATIVE PERCENT: 1 % (ref 1–4)
ERYTHROCYTE [DISTWIDTH] IN BLOOD BY AUTOMATED COUNT: 14.7 % (ref 11.8–14.4)
ESTIMATED FETAL WEIGHT: NORMAL
FEMORAL DIAMETER: NORMAL
FENTANYL UR QL: NEGATIVE
HC/AC: NORMAL
HCT VFR BLD AUTO: 34.6 % (ref 36.3–47.1)
HEAD CIRCUMFERENCE: NORMAL
HGB BLD-MCNC: 10.4 G/DL (ref 11.9–15.1)
IMM GRANULOCYTES # BLD AUTO: 0.11 K/UL (ref 0–0.3)
IMM GRANULOCYTES NFR BLD: 1 %
LYMPHOCYTES NFR BLD: 1.36 K/UL (ref 1.2–5.2)
LYMPHOCYTES RELATIVE PERCENT: 16 % (ref 25–45)
MCH RBC QN AUTO: 25.7 PG (ref 25.2–33.5)
MCHC RBC AUTO-ENTMCNC: 30.1 G/DL (ref 28.4–34.8)
MCV RBC AUTO: 85.4 FL (ref 82.6–102.9)
METER GLUCOSE: 79 MG/DL (ref 65–105)
METHADONE UR QL: NEGATIVE
MONOCYTES NFR BLD: 0.83 K/UL (ref 0.1–1.4)
MONOCYTES NFR BLD: 10 % (ref 2–8)
NEUTROPHILS NFR BLD: 72 % (ref 34–64)
NEUTS SEG NFR BLD: 6.35 K/UL (ref 1.8–8)
NRBC BLD-RTO: 0 PER 100 WBC
OPIATES UR QL SCN: NEGATIVE
OXYCODONE UR QL SCN: NEGATIVE
PCP UR QL SCN: NEGATIVE
PLATELET # BLD AUTO: 285 K/UL (ref 138–453)
PMV BLD AUTO: 9.4 FL (ref 8.1–13.5)
RBC # BLD AUTO: 4.05 M/UL (ref 3.95–5.11)
RBC # BLD: ABNORMAL 10*6/UL
T PALLIDUM AB SER QL IA: NONREACTIVE
TEST INFORMATION: NORMAL
WBC OTHER # BLD: 8.7 K/UL (ref 4.5–13.5)

## 2023-07-19 PROCEDURE — 76821 MIDDLE CEREBRAL ARTERY ECHO: CPT | Performed by: OBSTETRICS & GYNECOLOGY

## 2023-07-19 PROCEDURE — 86850 RBC ANTIBODY SCREEN: CPT

## 2023-07-19 PROCEDURE — 76819 FETAL BIOPHYS PROFIL W/O NST: CPT | Performed by: OBSTETRICS & GYNECOLOGY

## 2023-07-19 PROCEDURE — 2580000003 HC RX 258

## 2023-07-19 PROCEDURE — 6360000002 HC RX W HCPCS

## 2023-07-19 PROCEDURE — 1220000000 HC SEMI PRIVATE OB R&B

## 2023-07-19 PROCEDURE — 76820 UMBILICAL ARTERY ECHO: CPT | Performed by: OBSTETRICS & GYNECOLOGY

## 2023-07-19 PROCEDURE — 82947 ASSAY GLUCOSE BLOOD QUANT: CPT

## 2023-07-19 PROCEDURE — 76816 OB US FOLLOW-UP PER FETUS: CPT | Performed by: OBSTETRICS & GYNECOLOGY

## 2023-07-19 PROCEDURE — 86901 BLOOD TYPING SEROLOGIC RH(D): CPT

## 2023-07-19 PROCEDURE — 86900 BLOOD TYPING SEROLOGIC ABO: CPT

## 2023-07-19 PROCEDURE — 87081 CULTURE SCREEN ONLY: CPT

## 2023-07-19 PROCEDURE — 85027 COMPLETE CBC AUTOMATED: CPT

## 2023-07-19 PROCEDURE — 3E033VJ INTRODUCTION OF OTHER HORMONE INTO PERIPHERAL VEIN, PERCUTANEOUS APPROACH: ICD-10-PCS | Performed by: OBSTETRICS & GYNECOLOGY

## 2023-07-19 PROCEDURE — 86780 TREPONEMA PALLIDUM: CPT

## 2023-07-19 PROCEDURE — 80307 DRUG TEST PRSMV CHEM ANLYZR: CPT

## 2023-07-19 PROCEDURE — 99999 PR OFFICE/OUTPT VISIT,PROCEDURE ONLY: CPT | Performed by: OBSTETRICS & GYNECOLOGY

## 2023-07-19 RX ORDER — INSULIN LISPRO 100 [IU]/ML
0-4 INJECTION, SOLUTION INTRAVENOUS; SUBCUTANEOUS NIGHTLY
Status: DISCONTINUED | OUTPATIENT
Start: 2023-07-19 | End: 2023-07-19

## 2023-07-19 RX ORDER — ACETAMINOPHEN 500 MG
1000 TABLET ORAL EVERY 6 HOURS PRN
Status: DISCONTINUED | OUTPATIENT
Start: 2023-07-19 | End: 2023-07-20 | Stop reason: HOSPADM

## 2023-07-19 RX ORDER — SODIUM CHLORIDE 0.9 % (FLUSH) 0.9 %
5-40 SYRINGE (ML) INJECTION EVERY 12 HOURS SCHEDULED
Status: DISCONTINUED | OUTPATIENT
Start: 2023-07-19 | End: 2023-07-20 | Stop reason: HOSPADM

## 2023-07-19 RX ORDER — GLUCAGON 1 MG/ML
1 KIT INJECTION PRN
Status: DISCONTINUED | OUTPATIENT
Start: 2023-07-19 | End: 2023-07-20

## 2023-07-19 RX ORDER — SODIUM CHLORIDE 0.9 % (FLUSH) 0.9 %
5-40 SYRINGE (ML) INJECTION PRN
Status: DISCONTINUED | OUTPATIENT
Start: 2023-07-19 | End: 2023-07-20 | Stop reason: HOSPADM

## 2023-07-19 RX ORDER — DIPHENHYDRAMINE HCL 25 MG
25 TABLET ORAL EVERY 4 HOURS PRN
Status: DISCONTINUED | OUTPATIENT
Start: 2023-07-19 | End: 2023-07-20 | Stop reason: HOSPADM

## 2023-07-19 RX ORDER — SODIUM CHLORIDE, SODIUM LACTATE, POTASSIUM CHLORIDE, AND CALCIUM CHLORIDE .6; .31; .03; .02 G/100ML; G/100ML; G/100ML; G/100ML
500 INJECTION, SOLUTION INTRAVENOUS PRN
Status: DISCONTINUED | OUTPATIENT
Start: 2023-07-19 | End: 2023-07-20 | Stop reason: HOSPADM

## 2023-07-19 RX ORDER — MORPHINE SULFATE 10 MG/ML
8 INJECTION, SOLUTION INTRAMUSCULAR; INTRAVENOUS
Status: ACTIVE | OUTPATIENT
Start: 2023-07-19 | End: 2023-07-20

## 2023-07-19 RX ORDER — ONDANSETRON 2 MG/ML
4 INJECTION INTRAMUSCULAR; INTRAVENOUS EVERY 6 HOURS PRN
Status: DISCONTINUED | OUTPATIENT
Start: 2023-07-19 | End: 2023-07-20 | Stop reason: SDUPTHER

## 2023-07-19 RX ORDER — DEXTROSE MONOHYDRATE 100 MG/ML
INJECTION, SOLUTION INTRAVENOUS CONTINUOUS PRN
Status: DISCONTINUED | OUTPATIENT
Start: 2023-07-19 | End: 2023-07-22 | Stop reason: HOSPADM

## 2023-07-19 RX ORDER — SODIUM CHLORIDE, SODIUM LACTATE, POTASSIUM CHLORIDE, AND CALCIUM CHLORIDE .6; .31; .03; .02 G/100ML; G/100ML; G/100ML; G/100ML
1000 INJECTION, SOLUTION INTRAVENOUS PRN
Status: DISCONTINUED | OUTPATIENT
Start: 2023-07-19 | End: 2023-07-20 | Stop reason: HOSPADM

## 2023-07-19 RX ORDER — SODIUM CHLORIDE 9 MG/ML
INJECTION, SOLUTION INTRAVENOUS PRN
Status: DISCONTINUED | OUTPATIENT
Start: 2023-07-19 | End: 2023-07-20 | Stop reason: HOSPADM

## 2023-07-19 RX ORDER — MORPHINE SULFATE 2 MG/ML
2 INJECTION, SOLUTION INTRAMUSCULAR; INTRAVENOUS
Status: COMPLETED | OUTPATIENT
Start: 2023-07-19 | End: 2023-07-19

## 2023-07-19 RX ORDER — PROCHLORPERAZINE EDISYLATE 5 MG/ML
10 INJECTION INTRAMUSCULAR; INTRAVENOUS
Status: DISPENSED | OUTPATIENT
Start: 2023-07-19 | End: 2023-07-20

## 2023-07-19 RX ORDER — SODIUM CHLORIDE, SODIUM LACTATE, POTASSIUM CHLORIDE, CALCIUM CHLORIDE 600; 310; 30; 20 MG/100ML; MG/100ML; MG/100ML; MG/100ML
INJECTION, SOLUTION INTRAVENOUS CONTINUOUS
Status: DISCONTINUED | OUTPATIENT
Start: 2023-07-19 | End: 2023-07-22 | Stop reason: HOSPADM

## 2023-07-19 RX ORDER — INSULIN LISPRO 100 [IU]/ML
0-12 INJECTION, SOLUTION INTRAVENOUS; SUBCUTANEOUS PRN
Status: DISCONTINUED | OUTPATIENT
Start: 2023-07-19 | End: 2023-07-20

## 2023-07-19 RX ADMIN — MORPHINE SULFATE 2 MG: 2 INJECTION, SOLUTION INTRAMUSCULAR; INTRAVENOUS at 18:24

## 2023-07-19 RX ADMIN — Medication 1 MILLI-UNITS/MIN: at 20:08

## 2023-07-19 RX ADMIN — SODIUM CHLORIDE, POTASSIUM CHLORIDE, SODIUM LACTATE AND CALCIUM CHLORIDE: 600; 310; 30; 20 INJECTION, SOLUTION INTRAVENOUS at 17:52

## 2023-07-19 ASSESSMENT — PAIN SCALES - GENERAL: PAINLEVEL_OUTOF10: 0

## 2023-07-19 NOTE — H&P
done    Group B Strep: collected today  Cystic Fibrosis Screen: negative  Sickle Cell Screen: normal pattern on Hgb electrophoresis  First Trimester Screen: not available  QUAD screen: not available  msAFP: not available   Non-Invasive Prenatal Testing: low risk  Anatomy US: posterior/left lateral placenta, 3VC, female gender, normal anatomy, FGR identified    ASSESSMENT & PLAN:  [place] Alex is a 21 y.o. female  at 38w5d IUP   - GBS unknown / Rh positive / R immune   - No indication for GBS prophylaxis    IOL 2/2 FGR   - Admit to labor and delivery under the service of Dr. Briggs Lav   - VSS    - cEFM and TOCO   - Cat 1 FHT and TOCO showing no contractions   - CBC, T&S, T.Pal ordered   - UDS ordered.  R/B/A discussed with patient and patient agreeable   - IVF: LR @ 125 cc/hr   - Plan of Induction: Low dose pitocin and tee balloon    Marginal Cord Insertion      - Followed with MFM      Elv Early 1hr GTT, No 3hr GTT   - Ordered but not completed by pt      Hx Chlamydia   - TOR negative     Hx trich  - TOR neg      Hx Suicide Attempt   - Remote when pt was 13  - Denies SI/HI    Depression   - No meds at this time      FHx DM   - Abnl GTT    Hx hyperprolactinemia  - Pt was on Latuda at that time  - Imaging negative      BMI 32  Patient Active Problem List    Diagnosis Date Noted    H/O vitamin D deficiency 01/10/2023     Priority: Medium    Family Hx Autism 01/10/2023     Priority: Medium     Patient's brother        45 weeks gestation of pregnancy 2023    Dysmenorrhea 2023     Desires post-placental Mirena IUD         Marginal insertion of umbilical cord      Dx on MFM US on 23        FGR (AC<3rd%) 2023     MFM US ON 23  MFM U/S 23 AC 5th percentile  MFM U/S 23 AC 7th percentile  MFM US 23: AC < 3rd percentile        Elv Early 1hr GTT, No 3hr GTT 2023     Pt failed early 1 hour GTT - 3 hour GTT placed 23    Early 1hr elevated at 144  Patient did the encounter have been performed/reviewed by me. I agree with the assessment, plan and orders as documented by the resident.        Attending's Name:  Dani Wong MD

## 2023-07-19 NOTE — TELEPHONE ENCOUNTER
Patient has not been seen since 6/1/23. Fax received from [employer] for breast pump.   Form in triage--left a message for patient to call the office for an appointment

## 2023-07-19 NOTE — DISCHARGE SUMMARY
Obstetric Discharge Summary  [facility]    Patient Name: Blane Terrell  Patient : 2005  Primary Care Physician: Hari Harris  Admit Date: 2023    Principal Diagnosis: IUP at 38w5d, admitted for IOL 2/2 FGR with AC < 3rd Percentile      Her pregnancy has been complicated by:   Patient Active Problem List   Diagnosis    High risk teen pregnancy, antepartum    Hx hyperprolactinemia    H/O vitamin D deficiency    Hx Chlamydia (TOR neg)    FHx DM (father)    Depression    Transportation insecurity    Pregravid BMI 28.97    Family Hx Autism    Elv Early 1hr GTT, No 3hr GTT    FGR (AC<3rd%)    Marginal insertion of umbilical cord    38 weeks gestation of pregnancy    Dysmenorrhea     w/ IUD 23 F Apg** Wt 6#6       Infection Present?: No  Hospital Acquired: No    Surgical Operations & Procedures:  Analgesia: epidural  Delivery Type: Spontaneous Vaginal Delivery w/ Mirena IUD placement   Laceration(s): Absent    Consultations: Anesthesia    Pertinent Findings & Procedures:   Blane Terrell is a 21 y.o. female  at 38w5d admitted for IOL 2/2 FGR AC < 3rd Percentile; received  pitocin, tee balloon, morphine/compazine x1, epidural, AROM (clear). She delivered by spontaneous vaginal a Live Born infant on 23. Apgars: 8 at 1 minute and 9 at 5 minutes.        Postpartum course: normal.      Course of patient: uncomplicated    Discharge to: Home    Readmission planned: no     Indication for 6 week PP 2 hour GTT?: yes     Recommendations on Discharge:     Medications:      Medication List        START taking these medications      acetaminophen 500 MG tablet  Commonly known as: TYLENOL  Take 2 tablets by mouth every 6 hours as needed for Pain     ibuprofen 600 MG tablet  Commonly known as: ADVIL;MOTRIN  Take 1 tablet by mouth every 6 hours as needed for Pain     sennosides-docusate sodium 8.6-50 MG tablet  Commonly known as: SENOKOT-S  Take 1 tablet by mouth daily            CONTINUE taking these medications      Prenatal Vitamin 27-0.8 MG Tabs  Take 1 tablet by mouth daily May substitute with any prenatal vitamin covered by the patient's insurance. STOP taking these medications      aspirin EC 81 MG EC tablet               Where to Get Your Medications        These medications were sent to [address] [place] [address], [address] [place] [address] Drive [address]      Phone: [phone]   acetaminophen 500 MG tablet  ibuprofen 600 MG tablet  sennosides-docusate sodium 8.6-50 MG tablet           Activity: pelvic rest x 6 weeks, no lifting greater than 15 lbs  Diet: regular diet  Follow up: 2 weeks for  post partum visit    Condition on discharge: stable    Discharge date: 7/22/23    Lucero Mills MD  Ob/Gyn Resident    Comments:  Home care and follow-up care were reviewed. Pelvic rest, and birth control were reviewed. Signs and symptoms of mastitis and post partum depression were reviewed. The patient is to notify her physician if any of these occur. The patient was counseled on secondary smoke risks and the increased risk of sudden infant death syndrome and respiratory problems to her baby with exposure. She was counseled on various alternate recommendations to decrease the exposure to secondary smoke to her children.

## 2023-07-19 NOTE — PROGRESS NOTES
Maternal Fetal Medicine   Ultrasound Interval Note    Maria Eugenia Alfredo is a 21 y.o. female  at 38w5d         M Sono Report (Verbal): FGR, AC < 3rd Percentile     Plan: Patient is 38w5d and is in her delivery window for FGR with AC < 10th percentile (38w0d to 39w0d). Patient desires induction of labor at this time. Patient to report to labor and delivery for IOL. Please refer to report for further details. Dr. Linda Pinedo updated, OB Residents updated,  RN updated.      Napoleon Benedict DO  Ob/Gyn Resident  2023, 4:06 PM

## 2023-07-19 NOTE — FLOWSHEET NOTE
Patient resting comfortably in bed with EFM applied. Vitals WNL. Patient oriented to room. Admission reviewed with patient and questions answered. Will continue to monitor.

## 2023-07-19 NOTE — TELEPHONE ENCOUNTER
Patient called the office and scheduled an appointment for 7/21. Patient was told it is very important that she come in for her appointment.

## 2023-07-20 ENCOUNTER — ANESTHESIA (OUTPATIENT)
Dept: LABOR AND DELIVERY | Age: 18
End: 2023-07-20
Payer: MEDICAID

## 2023-07-20 ENCOUNTER — ANESTHESIA EVENT (OUTPATIENT)
Dept: LABOR AND DELIVERY | Age: 18
End: 2023-07-20
Payer: MEDICAID

## 2023-07-20 PROCEDURE — 7200000001 HC VAGINAL DELIVERY

## 2023-07-20 PROCEDURE — 0UH97HZ INSERTION OF CONTRACEPTIVE DEVICE INTO UTERUS, VIA NATURAL OR ARTIFICIAL OPENING: ICD-10-PCS | Performed by: OBSTETRICS & GYNECOLOGY

## 2023-07-20 PROCEDURE — 58300 INSERT INTRAUTERINE DEVICE: CPT | Performed by: OBSTETRICS & GYNECOLOGY

## 2023-07-20 PROCEDURE — 3700000025 EPIDURAL BLOCK: Performed by: ANESTHESIOLOGY

## 2023-07-20 PROCEDURE — 6360000002 HC RX W HCPCS: Performed by: ANESTHESIOLOGY

## 2023-07-20 PROCEDURE — 6360000002 HC RX W HCPCS: Performed by: NURSE ANESTHETIST, CERTIFIED REGISTERED

## 2023-07-20 PROCEDURE — 1220000000 HC SEMI PRIVATE OB R&B

## 2023-07-20 PROCEDURE — 2580000003 HC RX 258

## 2023-07-20 PROCEDURE — 59409 OBSTETRICAL CARE: CPT | Performed by: OBSTETRICS & GYNECOLOGY

## 2023-07-20 PROCEDURE — 6370000000 HC RX 637 (ALT 250 FOR IP)

## 2023-07-20 PROCEDURE — 10907ZC DRAINAGE OF AMNIOTIC FLUID, THERAPEUTIC FROM PRODUCTS OF CONCEPTION, VIA NATURAL OR ARTIFICIAL OPENING: ICD-10-PCS | Performed by: OBSTETRICS & GYNECOLOGY

## 2023-07-20 PROCEDURE — 2500000003 HC RX 250 WO HCPCS: Performed by: NURSE ANESTHETIST, CERTIFIED REGISTERED

## 2023-07-20 PROCEDURE — 6360000002 HC RX W HCPCS

## 2023-07-20 RX ORDER — SENNA AND DOCUSATE SODIUM 50; 8.6 MG/1; MG/1
1 TABLET, FILM COATED ORAL DAILY
Qty: 30 TABLET | Refills: 1 | Status: SHIPPED | OUTPATIENT
Start: 2023-07-20

## 2023-07-20 RX ORDER — ACETAMINOPHEN 500 MG
1000 TABLET ORAL EVERY 6 HOURS PRN
Qty: 60 TABLET | Refills: 1 | Status: SHIPPED | OUTPATIENT
Start: 2023-07-20

## 2023-07-20 RX ORDER — SODIUM CHLORIDE 9 MG/ML
INJECTION, SOLUTION INTRAVENOUS PRN
Status: DISCONTINUED | OUTPATIENT
Start: 2023-07-20 | End: 2023-07-22 | Stop reason: HOSPADM

## 2023-07-20 RX ORDER — IBUPROFEN 600 MG/1
600 TABLET ORAL EVERY 6 HOURS PRN
Status: DISCONTINUED | OUTPATIENT
Start: 2023-07-20 | End: 2023-07-22 | Stop reason: HOSPADM

## 2023-07-20 RX ORDER — ONDANSETRON 2 MG/ML
4 INJECTION INTRAMUSCULAR; INTRAVENOUS EVERY 6 HOURS PRN
Status: DISCONTINUED | OUTPATIENT
Start: 2023-07-20 | End: 2023-07-20 | Stop reason: HOSPADM

## 2023-07-20 RX ORDER — ONDANSETRON 2 MG/ML
4 INJECTION INTRAMUSCULAR; INTRAVENOUS EVERY 4 HOURS PRN
Status: DISCONTINUED | OUTPATIENT
Start: 2023-07-20 | End: 2023-07-22 | Stop reason: HOSPADM

## 2023-07-20 RX ORDER — SIMETHICONE 80 MG
80 TABLET,CHEWABLE ORAL EVERY 6 HOURS PRN
Status: DISCONTINUED | OUTPATIENT
Start: 2023-07-20 | End: 2023-07-22 | Stop reason: HOSPADM

## 2023-07-20 RX ORDER — ROPIVACAINE HYDROCHLORIDE 2 MG/ML
INJECTION, SOLUTION EPIDURAL; INFILTRATION; PERINEURAL
Status: COMPLETED
Start: 2023-07-20 | End: 2023-07-20

## 2023-07-20 RX ORDER — BISACODYL 10 MG
10 SUPPOSITORY, RECTAL RECTAL DAILY PRN
Status: DISCONTINUED | OUTPATIENT
Start: 2023-07-20 | End: 2023-07-22 | Stop reason: HOSPADM

## 2023-07-20 RX ORDER — ROPIVACAINE HYDROCHLORIDE 2 MG/ML
INJECTION, SOLUTION EPIDURAL; INFILTRATION; PERINEURAL PRN
Status: DISCONTINUED | OUTPATIENT
Start: 2023-07-20 | End: 2023-07-20 | Stop reason: SDUPTHER

## 2023-07-20 RX ORDER — LANOLIN 72 %
OINTMENT (GRAM) TOPICAL PRN
Status: DISCONTINUED | OUTPATIENT
Start: 2023-07-20 | End: 2023-07-22 | Stop reason: HOSPADM

## 2023-07-20 RX ORDER — MORPHINE SULFATE 10 MG/ML
10 INJECTION, SOLUTION INTRAMUSCULAR; INTRAVENOUS ONCE
Status: COMPLETED | OUTPATIENT
Start: 2023-07-20 | End: 2023-07-20

## 2023-07-20 RX ORDER — IBUPROFEN 600 MG/1
600 TABLET ORAL EVERY 6 HOURS PRN
Qty: 30 TABLET | Refills: 1 | Status: SHIPPED | OUTPATIENT
Start: 2023-07-20

## 2023-07-20 RX ORDER — LIDOCAINE HYDROCHLORIDE AND EPINEPHRINE 15; 5 MG/ML; UG/ML
INJECTION, SOLUTION EPIDURAL PRN
Status: DISCONTINUED | OUTPATIENT
Start: 2023-07-20 | End: 2023-07-20 | Stop reason: SDUPTHER

## 2023-07-20 RX ORDER — PROCHLORPERAZINE EDISYLATE 5 MG/ML
10 INJECTION INTRAMUSCULAR; INTRAVENOUS ONCE
Status: COMPLETED | OUTPATIENT
Start: 2023-07-20 | End: 2023-07-20

## 2023-07-20 RX ORDER — SODIUM CHLORIDE 0.9 % (FLUSH) 0.9 %
5-40 SYRINGE (ML) INJECTION EVERY 12 HOURS SCHEDULED
Status: DISCONTINUED | OUTPATIENT
Start: 2023-07-20 | End: 2023-07-22 | Stop reason: HOSPADM

## 2023-07-20 RX ORDER — SODIUM CHLORIDE 0.9 % (FLUSH) 0.9 %
5-40 SYRINGE (ML) INJECTION PRN
Status: DISCONTINUED | OUTPATIENT
Start: 2023-07-20 | End: 2023-07-22 | Stop reason: HOSPADM

## 2023-07-20 RX ORDER — ACETAMINOPHEN 500 MG
1000 TABLET ORAL EVERY 6 HOURS PRN
Status: DISCONTINUED | OUTPATIENT
Start: 2023-07-20 | End: 2023-07-22 | Stop reason: HOSPADM

## 2023-07-20 RX ORDER — DOCUSATE SODIUM 100 MG/1
100 CAPSULE, LIQUID FILLED ORAL 2 TIMES DAILY
Status: DISCONTINUED | OUTPATIENT
Start: 2023-07-20 | End: 2023-07-22 | Stop reason: HOSPADM

## 2023-07-20 RX ORDER — ROPIVACAINE HYDROCHLORIDE 2 MG/ML
INJECTION, SOLUTION EPIDURAL; INFILTRATION; PERINEURAL CONTINUOUS PRN
Status: DISCONTINUED | OUTPATIENT
Start: 2023-07-20 | End: 2023-07-20 | Stop reason: SDUPTHER

## 2023-07-20 RX ORDER — HYDROCORTISONE 25 MG/G
CREAM TOPICAL
Status: DISCONTINUED | OUTPATIENT
Start: 2023-07-20 | End: 2023-07-22 | Stop reason: HOSPADM

## 2023-07-20 RX ORDER — NALOXONE HYDROCHLORIDE 0.4 MG/ML
INJECTION, SOLUTION INTRAMUSCULAR; INTRAVENOUS; SUBCUTANEOUS PRN
Status: DISCONTINUED | OUTPATIENT
Start: 2023-07-20 | End: 2023-07-20 | Stop reason: HOSPADM

## 2023-07-20 RX ADMIN — ROPIVACAINE HYDROCHLORIDE 12 ML/HR: 2 INJECTION, SOLUTION EPIDURAL; INFILTRATION at 16:08

## 2023-07-20 RX ADMIN — ROPIVACAINE HYDROCHLORIDE 12 ML/HR: 2 INJECTION, SOLUTION EPIDURAL; INFILTRATION; PERINEURAL at 10:06

## 2023-07-20 RX ADMIN — SODIUM CHLORIDE, POTASSIUM CHLORIDE, SODIUM LACTATE AND CALCIUM CHLORIDE: 600; 310; 30; 20 INJECTION, SOLUTION INTRAVENOUS at 01:44

## 2023-07-20 RX ADMIN — SODIUM CHLORIDE, PRESERVATIVE FREE 10 ML: 5 INJECTION INTRAVENOUS at 20:46

## 2023-07-20 RX ADMIN — MORPHINE SULFATE 10 MG: 10 INJECTION INTRAVENOUS at 04:58

## 2023-07-20 RX ADMIN — PROCHLORPERAZINE EDISYLATE 10 MG: 5 INJECTION INTRAMUSCULAR; INTRAVENOUS at 04:59

## 2023-07-20 RX ADMIN — LIDOCAINE HYDROCHLORIDE,EPINEPHRINE BITARTRATE 3 ML: 15; .005 INJECTION, SOLUTION EPIDURAL; INFILTRATION; INTRACAUDAL; PERINEURAL at 10:06

## 2023-07-20 RX ADMIN — Medication 8 ML: at 10:06

## 2023-07-20 RX ADMIN — IBUPROFEN 600 MG: 600 TABLET, FILM COATED ORAL at 23:28

## 2023-07-20 RX ADMIN — SODIUM CHLORIDE, POTASSIUM CHLORIDE, SODIUM LACTATE AND CALCIUM CHLORIDE: 600; 310; 30; 20 INJECTION, SOLUTION INTRAVENOUS at 12:00

## 2023-07-20 RX ADMIN — SODIUM CHLORIDE, POTASSIUM CHLORIDE, SODIUM LACTATE AND CALCIUM CHLORIDE: 600; 310; 30; 20 INJECTION, SOLUTION INTRAVENOUS at 09:33

## 2023-07-20 RX ADMIN — DOCUSATE SODIUM 100 MG: 100 CAPSULE, LIQUID FILLED ORAL at 20:46

## 2023-07-20 ASSESSMENT — PAIN DESCRIPTION - DESCRIPTORS: DESCRIPTORS: CRAMPING

## 2023-07-20 ASSESSMENT — PAIN SCALES - GENERAL
PAINLEVEL_OUTOF10: 10
PAINLEVEL_OUTOF10: 5

## 2023-07-20 ASSESSMENT — PAIN DESCRIPTION - LOCATION
LOCATION: ABDOMEN
LOCATION: ABDOMEN

## 2023-07-20 ASSESSMENT — PAIN - FUNCTIONAL ASSESSMENT: PAIN_FUNCTIONAL_ASSESSMENT: ACTIVITIES ARE NOT PREVENTED

## 2023-07-20 ASSESSMENT — PAIN DESCRIPTION - ORIENTATION: ORIENTATION: LOWER

## 2023-07-20 NOTE — PROGRESS NOTES
Labor Progress Note    Chris Solorzano is a 21 y.o. female  at 37w9d  The patient was seen and examined. Her pain is well controlled with epidural. She reports fetal movement is present, complains of contractions, denies loss of fluid, denies vaginal bleeding.        Vital Signs:  Vitals:    23 1020 23 1025 23 1030 23 1045   BP: 119/79 120/77 119/73 125/72   Pulse: 91 90 95 (!) 117   Resp:   18    Temp:       TempSrc:       SpO2:  98% 97% 99%         FHT: 145, moderate variability, accelerations present, decelerations absent  Contractions: regular, every 2-3 minutes    Chaperone for Intimate Exam: Chaperone was present for entire exam, Chaperone Name: Anh Boston RN  Cervical Exam:   Pitocin: @ 16 mu/min    Membranes: Ruptured clear fluid  Scalp Electrode in place: absent  Intrauterine Pressure Catheter in Place: absent    Interventions: SVE, AROM (clear) @1042    Assessment/Plan:  Chris Solorzano is a 21 y.o. female  at 37w9d admitted for IOL 2/2 FGR (AC < 3%tile)   - GBS pending, No indication for GBS prophylaxis   - VSS, afebrile   - cEFM/TOCO- cat 1 tracing, regular contractions   - s/p tee balloon   - Morphine/Compazine x2   - Epidural    - SVE:    - AROM (clr)    - IV Fluids: 125mL/hr   - Continue pitocin per protocol   - Continue to monitor closely    Attending updated and in agreement with plan    Carlos Flores DO  Ob/Gyn Resident  2023, 10:49 AM

## 2023-07-20 NOTE — CARE COORDINATION
ANTEPARTUM NOTE    38 weeks gestation of pregnancy [Z3A.38]    Karol was admitted to L&D on 7/19/23 for IOL due to [address] @ 38w5d    OB GYN Provider: [facility]    Will meet with patient after delivery to verify name/address/phone/insurance and discuss discharge planning. Anticipate DC home 2 nights after vaginal delivery or 4 nights after C/S delivery as long as hemodynamically stable.

## 2023-07-20 NOTE — L&D DELIVERY NOTE
Tania Roberson [[id number]]      Labor Events     Labor: No   Steroids: None  Cervical Ripening Date/Time:      Rupture Identifier: Sac 1  Rupture Date/Time:  23 10:42:00   Rupture Type: AROM  Fluid Color: Clear  Fluid Odor: None  Fluid Volume: Moderate  Induction: Cervical Ripening Balloon, Oxytocin  Augmentation: None  Labor Complications: None              Anesthesia    Method: Epidural       Labor Event Times      Labor onset date/time:  23 10:40:00     Dilation complete date/time:  23 15:40:00     Start pushing date/time:  2023 16:00:00   Decision date/time (emergent ):            Delivery Details      Delivery Date: 23 Delivery Time: 16:44:00   Delivery Type: Vaginal, Spontaneous              [place] Presentation    Presentation: Vertex  Position: Left  _: Occiput  _: Anterior       Shoulder Dystocia    Shoulder Dystocia Present?: No       Assisted Delivery Details    Forceps Attempted?: No  Vacuum Extractor Attempted?: No                           Cord    Vessels: 3 Vessels  Complications: None  Delayed Cord Clamping?: Yes  Cord Clamped Date/Time: 2023 16:45:00  Cord Blood Disposition: Lab  Gases Sent?: Yes              Placenta    Date/Time: 2023 16:55:00  Removal: Spontaneous  Appearance: Intact  Disposition: Discarded       Lacerations    Episiotomy: None  Perineal Lacerations: None  Other Lacerations: no non-perineal laceration       Vaginal Counts    Initial Count Personnel: PREM MORFIN  Initial Count Verified By: Pretty Morgan Sponge Count: Correct Intial Needles Count: Correct Intial Instruments Count: Correct   Final Sponges Count: Correct Final Needles  Count: Correct Final Instruments Count: Correct   Final Count Personnel: Ayden Foster  Final Count Verified BySandra Coleman MD  Accurate Final Count?: Yes       Blood Loss  Mother: Marko Cisneros #[id number]     Start of Mother's Information      Delivery Blood Loss  23 1040 - 23 to push. After pushing with contractions the fetal head delivered Cephalic, right occiput anterior over an intact perineum, nuchal cord was not present. The anterior, then posterior shoulder delivered easily and atraumatically followed by the rest of the infant. Nose and mouth suctioned with bulb suction, infant was stimulated and dried. Cord was clamped and cut after one minute delayed cord clamping and infant was placed on mother's chest, and attended by RN for evaluation. The delivery of the placenta was spontaneous and appeared intact, whole, and that the umbilical cord had three vessels noted. Pitocin was started. The vagina was swept of all clots and debris. Patient had previously consented to [address] IUD placement 2/2 her history of dysmenorrhea. The Mirena, Lot # Jessica Jose Ramon date 2025/SEP. IUD was opened and loaded into the delivery system. The wand was inserted just past the internal portio and the button was retracted to the first line. The wand was held in place for 10 seconds and then the button was retracted to its final position while the IUD was moved to the fundus. The string was trimmed in standard fashion. The perineum and vagina were evaluated no lacerations were found. Mother and baby tolerated procedure well. Dr. Brnadan Palomino was present for entire delivery.     Delivery Summary:       Specimen: cord blood and cord gases  Quantitative blood loss:  25mL immediately following delivery  Condition:  infant stable to general nursery and mother stable  Counts: instrument and sponge counts correct  Blood Type and Rh: A POSITIVE    Rubella Immunity Status: immune  Infant Feeding: breast feeding    Nithin Hunter DO  Ob/Gyn Resident  7/20/2023, 5:16 PM

## 2023-07-20 NOTE — ANESTHESIA PROCEDURE NOTES
Epidural Block    Patient location during procedure: OB  Start time: 7/20/2023 10:00 AM  End time: 7/20/2023 10:04 AM  Reason for block: labor epidural  Staffing  Performed: resident/CRNA   Anesthesiologist: Castro Bueno MD  Resident/CRNA: ASHOK Otero CRNA  Epidural  Patient position: sitting  Prep: Betadine  Patient monitoring: continuous pulse ox and frequent blood pressure checks  Approach: midline  Location: L4-5  Injection technique: JAYDE saline  Guidance: paresthesia technique  Provider prep: mask and sterile gloves  Needle  Needle type: Tuohy   Needle gauge: 17 G  Needle length: 3.5 in  Needle insertion depth: 6 cm  Catheter type: end hole  Catheter at skin depth: 12 cm  Test dose: negativeCatheter Secured: tegaderm  Assessment  Hemodynamics: stable  Attempts: 1  Outcomes: uncomplicated and patient tolerated procedure well  Preanesthetic Checklist  Completed: patient identified, IV checked, site marked, risks and benefits discussed, surgical/procedural consents, equipment checked, pre-op evaluation, timeout performed, anesthesia consent given, oxygen available and monitors applied/VS acknowledged

## 2023-07-21 PROCEDURE — 6370000000 HC RX 637 (ALT 250 FOR IP)

## 2023-07-21 PROCEDURE — 1220000000 HC SEMI PRIVATE OB R&B

## 2023-07-21 RX ADMIN — ACETAMINOPHEN 1000 MG: 500 TABLET ORAL at 22:02

## 2023-07-21 RX ADMIN — DOCUSATE SODIUM 100 MG: 100 CAPSULE, LIQUID FILLED ORAL at 08:10

## 2023-07-21 RX ADMIN — DOCUSATE SODIUM 100 MG: 100 CAPSULE, LIQUID FILLED ORAL at 22:02

## 2023-07-21 RX ADMIN — IBUPROFEN 600 MG: 600 TABLET, FILM COATED ORAL at 08:10

## 2023-07-21 ASSESSMENT — PAIN SCALES - GENERAL
PAINLEVEL_OUTOF10: 8
PAINLEVEL_OUTOF10: 7

## 2023-07-21 ASSESSMENT — PAIN DESCRIPTION - LOCATION: LOCATION: ABDOMEN

## 2023-07-21 ASSESSMENT — PAIN DESCRIPTION - ORIENTATION: ORIENTATION: MID

## 2023-07-21 NOTE — ANESTHESIA POSTPROCEDURE EVALUATION
Department of Anesthesiology  Postprocedure Note    Patient: Maria Eugenia Alfredo  MRN: [id number]  YOB: 2005  Date of evaluation: 7/21/2023      Procedure Summary     Date: 07/20/23 Room / Location:     Anesthesia Start: 4866 Anesthesia Stop: 1644    Procedure: Labor Analgesia Diagnosis:     Scheduled Providers:  Responsible Provider: Darrin Parks MD    Anesthesia Type: epidural ASA Status: 2          Anesthesia Type: No value filed. Justin Phase I: Justin Score: 10    Justin Phase II:        Anesthesia Post Evaluation    Patient location during evaluation: floor  Patient participation: complete - patient participated  Level of consciousness: awake  Airway patency: patent  Nausea & Vomiting: no nausea and no vomiting  Complications: no  Cardiovascular status: blood pressure returned to baseline  Respiratory status: acceptable  Hydration status: euvolemic  Comments: Adequate recovery from labor epidural.  No residual effects.

## 2023-07-21 NOTE — CARE COORDINATION
CASE MANAGEMENT POST-PARTUM TRANSITIONAL CARE PLAN    38 weeks gestation of pregnancy [Z3A.38]    OB Provider: Pelontrinidad Pacheco met w/ Karol at her bedside to discuss DCP. She is S/P  on 23 @ 38w6d at 1644 of female infant    Writer verified address/phone number correct on facesheet. She states she lives with her mom. Karol verbalized no difficulties with transportation to and from doctors appointments (as long as its an afternoon appointment because her uncle can take her in the afternoon) or with paying for medications upon discharge home. [facility] Medicaid insurance correct. Writer notified Cristino Holstein she has 30 days from date of birth to add  to insurance policy by contacting [address] [place] [address]. She verbalized understanding. Karol confirmed a safe place for infant to sleep at home. Infant name on BC: Elizabeth Bishop. Infant PCP [facility]. DME: none  HOME CARE: none    Anticipate DC home of couplet in private vehicle in 1-2 days status post vaginal delivery.     Readmission Risk              Risk of Unplanned Readmission:  5

## 2023-07-21 NOTE — FLOWSHEET NOTE
RN walked with patient to NICU. RN informed patient to come back when she needs medications or if she has a change in status. Patient verbalized understanding. NICU RN at bedside  encouraged to call if patient needs assistance.

## 2023-07-21 NOTE — CARE COORDINATION
Social Work     Sw reviewed medical record (current active problem list) and tox screens and found no current concerns. Sw spoke with mom briefly to explain Sw role, inquire if any needs or concerns, and provide safe sleep education and discuss. Mom denied any needs or questions and informs baby has a safe sleep environment (bassinet). Mom denied any current s/s of anxiety or depression and is aware to reach out to OB if any s/s occur after dc. Mom reports a good support system and denied any current questions or needs. Mom reports this is her 1st baby. Mom states ped is not yet chosen, she will need list via CM. Sw encouraged mom to reach out if any issues or concerns arise.

## 2023-07-21 NOTE — LACTATION NOTE
Initiation of Electric Breast Pumping     Pumping Initiated at 1000    Initiated due to    [x]   Baby in NICU   []   Plans exclusive pumping   []   Infant weight loss(supplement)   []   Baby not latching well    Flange Size    Right:   Left:     [x]   24    [x]   24     []   27    []   27     []   30    []   30     []   36    []   36  Instructions   [x]   Verbal instructions on how to setup pump and how to use initiation phase   [x]   Written sheet\" How to keep your breast pump kit clean\"   [x]   Expectation sheet for Breastfeeding mothers with pumping log   [x]   Frequency of pumping   [x]   Collection,labeling and storage of colostrum and milk    Supplies Provided   [x]   Pump initiation kit   [x]   Cleaning supplies (basin and soap)   []   Additional flange size   [x]   Oral syringes/snappies   [x]   Patient labels       -   mom states she has a pump ready for her at [facility] office, but was unable to  before delivery. Encouraged her to see if a family member could  for her.

## 2023-07-21 NOTE — FLOWSHEET NOTE
RN called NICU for patient to come up for a set of vitals and assessment. NICU RN unable to answer phone. Will attempt again prior to end of shift.

## 2023-07-22 VITALS
TEMPERATURE: 98.2 F | SYSTOLIC BLOOD PRESSURE: 109 MMHG | OXYGEN SATURATION: 98 % | RESPIRATION RATE: 16 BRPM | HEART RATE: 78 BPM | DIASTOLIC BLOOD PRESSURE: 70 MMHG

## 2023-07-22 LAB
MICROORGANISM SPEC CULT: NORMAL
SPECIMEN DESCRIPTION: NORMAL

## 2023-07-22 PROCEDURE — 6370000000 HC RX 637 (ALT 250 FOR IP)

## 2023-07-22 RX ADMIN — DOCUSATE SODIUM 100 MG: 100 CAPSULE, LIQUID FILLED ORAL at 10:25

## 2023-07-22 NOTE — FLOWSHEET NOTE
Discharge instructions given with understanding voiced. [place] Save your life: Post-Birth Warning Signs handout reviewed and given to mother. Understanding verbalized.

## 2023-07-24 ENCOUNTER — HOSPITAL ENCOUNTER (OUTPATIENT)
Age: 18
Discharge: HOME | End: 2023-07-24
Payer: MEDICAID

## 2023-07-24 LAB — HBV SURFACE AG SERPL QL IA: NONREACTIVE

## 2023-07-24 PROCEDURE — 87340 HEPATITIS B SURFACE AG IA: CPT

## 2023-07-24 PROCEDURE — 36415 COLL VENOUS BLD VENIPUNCTURE: CPT

## 2023-08-02 ENCOUNTER — POSTPARTUM VISIT (OUTPATIENT)
Dept: OBGYN | Age: 18
End: 2023-08-02
Payer: MEDICAID

## 2023-08-02 VITALS
WEIGHT: 192 LBS | HEIGHT: 67 IN | SYSTOLIC BLOOD PRESSURE: 139 MMHG | BODY MASS INDEX: 30.13 KG/M2 | HEART RATE: 107 BPM | DIASTOLIC BLOOD PRESSURE: 85 MMHG

## 2023-08-02 PROCEDURE — 99024 POSTOP FOLLOW-UP VISIT: CPT

## 2023-08-02 PROCEDURE — 99211 OFF/OP EST MAY X REQ PHY/QHP: CPT

## 2023-08-02 NOTE — PROGRESS NOTES
Postpartum Visit    Vanda Milner is a 21 y.o. female , 2 weeks Post Partum s/p   w/ IUD insertion on 23    The patient was seen. She has no chief complaint today. She is  breast feeding and denies signs or symptoms of mastitis. The patient completed the E.P.D.S. Post Partum Depression Evaluation form and EPDS Score of 0. She does not have signs or symptoms of post partum depression. She denies any suicidal thoughts with a plan, intent to harm others, and delusional ideas. She does admit to having good home support. Today her lochia is light she denies any dizziness or shortness of breath. Her bowels are regular and she denies any urinary tract symptomology. She is using Mirena IUD for history of dysmenorrhea. Patient Active Problem List    Diagnosis Date Noted    H/O vitamin D deficiency 01/10/2023     Priority: Medium    Family Hx Autism 01/10/2023     Priority: Medium     Overview Note:     Patient's brother         w/ IUD 21 F Apg 8/9 Wt 6#6 2023     Overview Note:     Mirena IUD placed.   Lot #[id number]  Exp: 2025/Sep        38 weeks gestation of pregnancy 2023    Dysmenorrhea 2023     Overview Note:     Desires post-placental Mirena IUD         Marginal insertion of umbilical cord      Overview Note:     Dx on MFM US on 23        FGR (AC<3rd%) 2023     Overview Note:     MFM US ON 23  MFM U/S 23 AC 5th percentile  MFM U/S 23 AC 7th percentile  MFM US 23: AC < 3rd percentile        Elv Early 1hr GTT, No 3hr GTT 2023     Overview Note:     Pt failed early 1 hour GTT - 3 hour GTT placed 23    Early 1hr elevated at 144  Patient did not completed early 3hr GTT or 3 hr GTT        High risk teen pregnancy, antepartum 01/10/2023     Overview Note:     Fetal testing indicated: No  Fetal testing indication: N/A  Fetal testing initiation: N/A  Fetal testing frequency: N/A      Hx hyperprolactinemia 01/10/2023

## 2023-08-14 ENCOUNTER — TELEPHONE (OUTPATIENT)
Dept: OBGYN | Age: 18
End: 2023-08-14

## 2023-08-14 NOTE — TELEPHONE ENCOUNTER
Called patient and she states she would like to stop breast feeding and was wondering if theres a medication we can give her to help dry her milk

## 2023-08-14 NOTE — TELEPHONE ENCOUNTER
Patient is requesting a call regarding a clinical question  prior to her 08/30/2023 OV and can be reached at [phone]. Okay to leave a message.

## 2023-08-30 ENCOUNTER — POSTPARTUM VISIT (OUTPATIENT)
Dept: OBGYN | Age: 18
End: 2023-08-30

## 2023-08-30 VITALS
BODY MASS INDEX: 29.76 KG/M2 | DIASTOLIC BLOOD PRESSURE: 79 MMHG | HEART RATE: 90 BPM | SYSTOLIC BLOOD PRESSURE: 118 MMHG | WEIGHT: 190 LBS

## 2023-08-30 DIAGNOSIS — O09.899 HIGH RISK TEEN PREGNANCY, ANTEPARTUM: Primary | ICD-10-CM

## 2023-08-30 PROBLEM — Z3A.38 38 WEEKS GESTATION OF PREGNANCY: Status: RESOLVED | Noted: 2023-07-19 | Resolved: 2023-08-30
